# Patient Record
Sex: FEMALE | URBAN - METROPOLITAN AREA
[De-identification: names, ages, dates, MRNs, and addresses within clinical notes are randomized per-mention and may not be internally consistent; named-entity substitution may affect disease eponyms.]

---

## 2017-03-28 ENCOUNTER — APPOINTMENT (OUTPATIENT)
Dept: URBAN - METROPOLITAN AREA CLINIC 211 | Age: 57
Setting detail: DERMATOLOGY
End: 2017-03-30

## 2017-03-28 DIAGNOSIS — D22 MELANOCYTIC NEVI: ICD-10-CM

## 2017-03-28 DIAGNOSIS — D18.0 HEMANGIOMA: ICD-10-CM

## 2017-03-28 DIAGNOSIS — Z41.9 ENCOUNTER FOR PROCEDURE FOR PURPOSES OTHER THAN REMEDYING HEALTH STATE, UNSPECIFIED: ICD-10-CM

## 2017-03-28 DIAGNOSIS — L82.1 OTHER SEBORRHEIC KERATOSIS: ICD-10-CM

## 2017-03-28 DIAGNOSIS — L81.4 OTHER MELANIN HYPERPIGMENTATION: ICD-10-CM

## 2017-03-28 PROBLEM — D18.01 HEMANGIOMA OF SKIN AND SUBCUTANEOUS TISSUE: Status: ACTIVE | Noted: 2017-03-28

## 2017-03-28 PROBLEM — D22.5 MELANOCYTIC NEVI OF TRUNK: Status: ACTIVE | Noted: 2017-03-28

## 2017-03-28 PROCEDURE — OTHER MIPS QUALITY: OTHER

## 2017-03-28 PROCEDURE — OTHER MEDICAL CONSULTATION: COOLSCULPTING: OTHER

## 2017-03-28 PROCEDURE — OTHER REASSURANCE: OTHER

## 2017-03-28 PROCEDURE — OTHER OTHER (COSMETIC): OTHER

## 2017-03-28 PROCEDURE — OTHER OBSERVATION AND MEASURE: OTHER

## 2017-03-28 PROCEDURE — OTHER OTHER: OTHER

## 2017-03-28 PROCEDURE — 99214 OFFICE O/P EST MOD 30 MIN: CPT

## 2017-03-28 PROCEDURE — OTHER COUNSELING: OTHER

## 2017-03-28 PROCEDURE — OTHER OBSERVATION: OTHER

## 2017-03-28 ASSESSMENT — LOCATION SIMPLE DESCRIPTION DERM
LOCATION SIMPLE: LOWER BACK
LOCATION SIMPLE: ABDOMEN
LOCATION SIMPLE: RIGHT UPPER BACK
LOCATION SIMPLE: UPPER BACK

## 2017-03-28 ASSESSMENT — LOCATION DETAILED DESCRIPTION DERM
LOCATION DETAILED: RIGHT INFERIOR MEDIAL UPPER BACK
LOCATION DETAILED: EPIGASTRIC SKIN
LOCATION DETAILED: INFERIOR LUMBAR SPINE
LOCATION DETAILED: RIGHT SUPERIOR UPPER BACK
LOCATION DETAILED: SUPERIOR THORACIC SPINE

## 2017-03-28 ASSESSMENT — LOCATION ZONE DERM: LOCATION ZONE: TRUNK

## 2017-03-28 NOTE — PROCEDURE: OTHER (COSMETIC)
Detail Level: Zone
Other (Free Text): Cosmetic Consultation\\nPE:\\n\\nPLAN:\\n1.\\n2.\\n3.\\n\\nNOTE:\\Keon indications, treatment expectations (including management of any possible irritations), protocols, risks and benefits, pre/post care are reviewed. Details of these can be found on the appropriate attached informed consent documentation. Patient understands that multiple treatments may be necessary for optimum results and that ongoing maintenance with at-home products and additional office visits or treatments may be needed to enhance and extend the desired results.\\nAnswered all questions\\nRTC-\\nnumbing 40mins prior to IPL/Lasers

## 2017-03-28 NOTE — PROCEDURE: MIPS QUALITY
Quality 110: Preventive Care And Screening: Influenza Immunization: Influenza Immunization previously received during influenza season
Detail Level: Detailed
Quality 131: Pain Assessment And Follow-Up: Pain assessment documented as positive using a standardized tool AND a follow-up plan is documented
Quality 226: Preventive Care And Screening: Tobacco Use: Screening And Cessation Intervention: Patient screened for tobacco and never smoked
Quality 130: Documentation Of Current Medications In The Medical Record: Current Medications Documented
Quality 431: Preventive Care And Screening: Unhealthy Alcohol Use - Screening: Patient screened for unhealthy alcohol use using a single question and scores less than 2 times per year
Quality 400a: One-Time Screening For Hepatitis C Virus (Hcv) For All Patients: Documentation of patient reason(s) for not receiving one-time screening for HCV infection

## 2017-03-28 NOTE — PROCEDURE: OTHER
Note Text (......Xxx Chief Complaint.): This diagnosis correlates with the
Detail Level: Detailed
Other (Free Text): Pt has concern about her chin and her lower abdomen.  After taking further with the pt she really had concern with the jowling in the chin area verses the fat.  Rec Infini x 3 treatments.  After assessing the pt's abdomen it is determined that she is a candidate for Coolsculpting.  The following rec was made:\\n\\n1.  CoolAdvantage Plus for the lower abdomen\\n\\nThe procedure was reviewed in detail and all questions and concerns were addressed.

## 2017-03-28 NOTE — HPI: OTHER
Condition:: Cosmetic
Please Describe Your Condition:: Pt has concern about her chin area and how she is aging in that area.  \\n\\nReviewed with pt that if she feels she is at risk for Hep C to see her PCP for screening.

## 2017-03-28 NOTE — PROCEDURE: MIPS QUALITY
Detail Level: Detailed
Quality 400a: One-Time Screening For Hepatitis C Virus (Hcv) For All Patients: Documentation of patient reason(s) for not receiving one-time screening for HCV infection
Quality 226: Preventive Care And Screening: Tobacco Use: Screening And Cessation Intervention: Patient screened for tobacco and never smoked
Quality 131: Pain Assessment And Follow-Up: Pain assessment documented as positive using a standardized tool AND a follow-up plan is documented
Quality 431: Preventive Care And Screening: Unhealthy Alcohol Use - Screening: Patient screened for unhealthy alcohol use using a single question and scores less than 2 times per year
Quality 110: Preventive Care And Screening: Influenza Immunization: Influenza Immunization previously received during influenza season
Quality 130: Documentation Of Current Medications In The Medical Record: Current Medications Documented

## 2017-03-28 NOTE — PROCEDURE: MIPS QUALITY
Quality 400a: One-Time Screening For Hepatitis C Virus (Hcv) For All Patients: Documentation of patient reason(s) for not receiving one-time screening for HCV infection
Quality 226: Preventive Care And Screening: Tobacco Use: Screening And Cessation Intervention: Patient screened for tobacco and never smoked
Detail Level: Detailed
Quality 131: Pain Assessment And Follow-Up: Pain assessment documented as positive using a standardized tool AND a follow-up plan is documented
Quality 431: Preventive Care And Screening: Unhealthy Alcohol Use - Screening: Patient screened for unhealthy alcohol use using a single question and scores less than 2 times per year
Quality 130: Documentation Of Current Medications In The Medical Record: Current Medications Documented
Quality 110: Preventive Care And Screening: Influenza Immunization: Influenza Immunization previously received during influenza season

## 2017-05-02 ENCOUNTER — APPOINTMENT (OUTPATIENT)
Dept: URBAN - METROPOLITAN AREA CLINIC 211 | Age: 57
Setting detail: DERMATOLOGY
End: 2017-05-03

## 2017-05-02 DIAGNOSIS — Z41.9 ENCOUNTER FOR PROCEDURE FOR PURPOSES OTHER THAN REMEDYING HEALTH STATE, UNSPECIFIED: ICD-10-CM

## 2017-05-02 PROCEDURE — OTHER COOLSCULPTING: OTHER

## 2017-05-02 PROCEDURE — OTHER OTHER (COSMETIC): OTHER

## 2017-05-02 PROCEDURE — OTHER MIPS QUALITY: OTHER

## 2017-05-02 ASSESSMENT — LOCATION ZONE DERM: LOCATION ZONE: TRUNK

## 2017-05-02 ASSESSMENT — LOCATION SIMPLE DESCRIPTION DERM: LOCATION SIMPLE: ABDOMEN

## 2017-05-02 ASSESSMENT — LOCATION DETAILED DESCRIPTION DERM: LOCATION DETAILED: PERIUMBILICAL SKIN

## 2017-05-02 NOTE — PROCEDURE: COOLSCULPTING
Intro: Prior to treatment, the area was cleaned with alcohol and marked out with a marking pen. The gel sheet was then applied uniformly. The applicator was applied to the skin with good contact and suction.
Post-Care Instructions: I reviewed with the patient in detail post-care instructions. Patient should stay away from the sun and wear sun protection until treated areas are fully healed.
Time (Minutes): 45
Indication: non-invasive fat removal
Applicators: CoolAdvantage Plus Core
Detail Level: Zone
Consent: Written consent obtained, risks reviewed including but not limited to blistering from suction, darker or lighter pigmentary change, bruising, and/or need for multiple treatments.
Device: Unique Property
Patient Weight-Include Units (Optional): 164.6 lbs without shoes
Location: lower abdomen
Post Treatment: After treatment, the suction was turned off, and the applicator was removed from the skin.  Immediately following the removal of the applicator the Norm Z Wave was used to massage the treated area per protocol.
Suction Settings: The suction settings were per protocol.

## 2017-05-02 NOTE — PROCEDURE: OTHER (COSMETIC)
Other (Free Text): Pt inquired if her lower flanks would meet the criteria to be treated by Coolsculpting.  After assessing the pt it is determined that she is a candidate for Coolsculpting.  The following rec was made:\\n\\n1.  CoolCurve Advantage for the right and left lower flanks
Detail Level: Zone

## 2017-05-02 NOTE — PROCEDURE: MIPS QUALITY
Quality 110: Preventive Care And Screening: Influenza Immunization: Influenza Immunization previously received during influenza season
Quality 130: Documentation Of Current Medications In The Medical Record: Current Medications Documented
Detail Level: Detailed
Quality 400a: One-Time Screening For Hepatitis C Virus (Hcv) For All Patients: Documentation of patient reason(s) for not receiving one-time screening for HCV infection
Quality 431: Preventive Care And Screening: Unhealthy Alcohol Use - Screening: Patient screened for unhealthy alcohol use using a single question and scores less than 2 times per year
Quality 131: Pain Assessment And Follow-Up: Pain assessment documented as positive using a standardized tool AND a follow-up plan is documented
Quality 226: Preventive Care And Screening: Tobacco Use: Screening And Cessation Intervention: Patient screened for tobacco and never smoked

## 2017-06-02 ENCOUNTER — APPOINTMENT (OUTPATIENT)
Dept: URBAN - METROPOLITAN AREA CLINIC 211 | Age: 57
Setting detail: DERMATOLOGY
End: 2017-06-02

## 2017-06-02 DIAGNOSIS — Z41.9 ENCOUNTER FOR PROCEDURE FOR PURPOSES OTHER THAN REMEDYING HEALTH STATE, UNSPECIFIED: ICD-10-CM

## 2017-06-02 PROCEDURE — OTHER COSMETIC FOLLOW-UP: OTHER

## 2017-06-02 PROCEDURE — OTHER MIPS QUALITY: OTHER

## 2017-06-02 NOTE — HPI: OTHER
Condition:: Coolsculpting Follow up
Please Describe Your Condition:: Pt presents for her 30 day follow up for Coolsculpting to her lower abdomen.  Pt states she had a lot of tenderness and swelling.  She says she still has some residual numbness.  \\n\\nReviewed with pt that if she feels she is at risk for Hep C to see her PCP for screening.

## 2017-06-02 NOTE — PROCEDURE: COSMETIC FOLLOW-UP
Global Improvement: None
Comments (Free Text): Pt weighed 161 today verses 165 on treatment day.  After photos were taken and compared to the before photos.  At this point there is not any improvement seen.  Reassured pt that she has an additional 60 days to see results.  Pt will rtc in 30 days for her 60 day follow up and we will also assess abdomen again to see if a second treatment is needed.
Treatment Override (Free Text): Coolsculpting
Detail Level: Zone

## 2017-06-02 NOTE — PROCEDURE: MIPS QUALITY
Quality 226: Preventive Care And Screening: Tobacco Use: Screening And Cessation Intervention: Patient screened for tobacco and never smoked
Quality 400a: One-Time Screening For Hepatitis C Virus (Hcv) For All Patients: Documentation of patient reason(s) for not receiving one-time screening for HCV infection
Detail Level: Detailed
Quality 130: Documentation Of Current Medications In The Medical Record: Current Medications Documented
Quality 131: Pain Assessment And Follow-Up: Pain assessment documented as positive using a standardized tool AND a follow-up plan is documented
Quality 110: Preventive Care And Screening: Influenza Immunization: Influenza Immunization previously received during influenza season
Quality 431: Preventive Care And Screening: Unhealthy Alcohol Use - Screening: Patient screened for unhealthy alcohol use using a single question and scores less than 2 times per year

## 2018-08-28 ENCOUNTER — APPOINTMENT (OUTPATIENT)
Dept: URBAN - METROPOLITAN AREA CLINIC 211 | Age: 58
Setting detail: DERMATOLOGY
End: 2018-08-29

## 2018-08-28 DIAGNOSIS — D22 MELANOCYTIC NEVI: ICD-10-CM

## 2018-08-28 DIAGNOSIS — L82.0 INFLAMED SEBORRHEIC KERATOSIS: ICD-10-CM

## 2018-08-28 DIAGNOSIS — L82.1 OTHER SEBORRHEIC KERATOSIS: ICD-10-CM

## 2018-08-28 DIAGNOSIS — L91.8 OTHER HYPERTROPHIC DISORDERS OF THE SKIN: ICD-10-CM

## 2018-08-28 DIAGNOSIS — D18.0 HEMANGIOMA: ICD-10-CM

## 2018-08-28 DIAGNOSIS — L81.4 OTHER MELANIN HYPERPIGMENTATION: ICD-10-CM

## 2018-08-28 DIAGNOSIS — Z71.89 OTHER SPECIFIED COUNSELING: ICD-10-CM

## 2018-08-28 PROBLEM — D18.01 HEMANGIOMA OF SKIN AND SUBCUTANEOUS TISSUE: Status: ACTIVE | Noted: 2018-08-28

## 2018-08-28 PROBLEM — J45.909 UNSPECIFIED ASTHMA, UNCOMPLICATED: Status: ACTIVE | Noted: 2018-08-28

## 2018-08-28 PROBLEM — D22.5 MELANOCYTIC NEVI OF TRUNK: Status: ACTIVE | Noted: 2018-08-28

## 2018-08-28 PROCEDURE — 99214 OFFICE O/P EST MOD 30 MIN: CPT | Mod: 25

## 2018-08-28 PROCEDURE — OTHER REASSURANCE: OTHER

## 2018-08-28 PROCEDURE — OTHER SKIN TAG REMOVAL (COSMETIC): OTHER

## 2018-08-28 PROCEDURE — OTHER TREATMENT REGIMEN: OTHER

## 2018-08-28 PROCEDURE — OTHER LIQUID NITROGEN: OTHER

## 2018-08-28 PROCEDURE — OTHER OBSERVATION: OTHER

## 2018-08-28 PROCEDURE — OTHER MIPS QUALITY: OTHER

## 2018-08-28 PROCEDURE — OTHER OBSERVATION AND MEASURE: OTHER

## 2018-08-28 PROCEDURE — OTHER COUNSELING: OTHER

## 2018-08-28 PROCEDURE — 17110 DESTRUCT B9 LESION 1-14: CPT

## 2018-08-28 ASSESSMENT — LOCATION ZONE DERM
LOCATION ZONE: TRUNK
LOCATION ZONE: EYELID

## 2018-08-28 ASSESSMENT — LOCATION SIMPLE DESCRIPTION DERM
LOCATION SIMPLE: RIGHT UPPER BACK
LOCATION SIMPLE: LOWER BACK
LOCATION SIMPLE: CHEST
LOCATION SIMPLE: RIGHT EYELID
LOCATION SIMPLE: RIGHT BREAST
LOCATION SIMPLE: ABDOMEN
LOCATION SIMPLE: LEFT BREAST

## 2018-08-28 ASSESSMENT — LOCATION DETAILED DESCRIPTION DERM
LOCATION DETAILED: LOWER STERNUM
LOCATION DETAILED: RIGHT SUPERIOR UPPER BACK
LOCATION DETAILED: RIGHT MEDIAL BREAST 3-4:00 REGION
LOCATION DETAILED: XIPHOID
LOCATION DETAILED: STERNAL NOTCH
LOCATION DETAILED: INFERIOR LUMBAR SPINE
LOCATION DETAILED: RIGHT LATERAL CANTHUS
LOCATION DETAILED: LEFT MEDIAL BREAST 8-9:00 REGION
LOCATION DETAILED: LEFT MEDIAL BREAST 9-10:00 REGION
LOCATION DETAILED: RIGHT INFRAMAMMARY CREASE (INNER QUADRANT)
LOCATION DETAILED: RIGHT MID-UPPER BACK
LOCATION DETAILED: LEFT MEDIAL SUPERIOR CHEST

## 2018-08-28 NOTE — PROCEDURE: TREATMENT REGIMEN
Plan: Long discussion of R&B of SK removal. Counseled on LN2 vs. Curretage vs. Eskata. Advised it would take multiple treatments to remove all of these lesions, advised we treat irritated lesions first and proceed if desired
Detail Level: Zone

## 2018-08-28 NOTE — PROCEDURE: LIQUID NITROGEN
Render Post-Care Instructions In Note?: no
Number Of Freeze-Thaw Cycles: 2 freeze-thaw cycles
Duration Of Freeze Thaw-Cycle (Seconds): 5-10
Medical Necessity Information: It is in your best interest to select a reason for this procedure from the list below. All of these items fulfill various CMS LCD requirements except the new and changing color options.
Detail Level: Detailed
Consent: The patient's consent was obtained including but not limited to risks of crusting, scabbing, blistering, scarring, darker or lighter pigmentary change, recurrence, incomplete removal and infection.
Post-Care Instructions: I reviewed with the patient in detail post-care instructions. Patient is to wear sunprotection, and avoid picking at any of the treated lesions. Pt may apply Vaseline to crusted or scabbing areas.
Medical Necessity Clause: This procedure was medically necessary because the lesions that were treated were:

## 2018-08-28 NOTE — PROCEDURE: SKIN TAG REMOVAL (COSMETIC)
Consent: Written consent obtained and the risks of skin tag removal was reviewed with the patient including but not limited to bleeding, pigmentary change, infection, pain, and remote possibility of scarring.
Detail Level: Detailed
Anesthesia Type: 1% lidocaine with epinephrine
Removed With: scissors

## 2018-08-28 NOTE — PROCEDURE: MIPS QUALITY
Quality 400a: One-Time Screening For Hepatitis C Virus (Hcv) For All Patients: Patient received one-time screening for HCV infection
Quality 130: Documentation Of Current Medications In The Medical Record: Current Medications Documented
Detail Level: Detailed
Quality 226: Preventive Care And Screening: Tobacco Use: Screening And Cessation Intervention: Patient screened for tobacco and never smoked
Quality 131: Pain Assessment And Follow-Up: Pain assessment using a standardized tool is documented as negative, no follow-up plan required
Quality 110: Preventive Care And Screening: Influenza Immunization: Influenza Immunization Administered during Influenza season
Quality 431: Preventive Care And Screening: Unhealthy Alcohol Use - Screening: Patient screened for unhealthy alcohol use using a single question and scores less than 2 times per year